# Patient Record
Sex: FEMALE | Race: WHITE | URBAN - METROPOLITAN AREA
[De-identification: names, ages, dates, MRNs, and addresses within clinical notes are randomized per-mention and may not be internally consistent; named-entity substitution may affect disease eponyms.]

---

## 2022-09-16 ENCOUNTER — OFFICE VISIT (OUTPATIENT)
Dept: URBAN - METROPOLITAN AREA CLINIC 51 | Facility: CLINIC | Age: 77
End: 2022-09-16
Payer: COMMERCIAL

## 2022-09-16 DIAGNOSIS — H02.423 MYOGENIC PTOSIS OF BILATERAL EYELIDS: Primary | ICD-10-CM

## 2022-09-16 PROCEDURE — 99204 OFFICE O/P NEW MOD 45 MIN: CPT | Performed by: OPHTHALMOLOGY

## 2022-09-16 ASSESSMENT — KERATOMETRY
OD: 46.17
OS: 46.49

## 2022-09-16 ASSESSMENT — VISUAL ACUITY
OD: 20/25
OS: 20/25

## 2022-09-16 ASSESSMENT — INTRAOCULAR PRESSURE
OS: 15
OD: 17

## 2022-09-16 NOTE — IMPRESSION/PLAN
Impression: Myogenic ptosis of bilateral eyelids: H02.423. Plan: Discussed  with patient. Discussed treatment options. Surgical treatment is recommended. Surgical risk and benefits were discussed. The patient elects to proceed with surgery. Recommend surgery .